# Patient Record
Sex: MALE | Race: OTHER | HISPANIC OR LATINO | URBAN - METROPOLITAN AREA
[De-identification: names, ages, dates, MRNs, and addresses within clinical notes are randomized per-mention and may not be internally consistent; named-entity substitution may affect disease eponyms.]

---

## 2023-04-07 ENCOUNTER — EMERGENCY (EMERGENCY)
Facility: HOSPITAL | Age: 53
LOS: 1 days | Discharge: HOME | End: 2023-04-07
Attending: EMERGENCY MEDICINE
Payer: SELF-PAY

## 2023-04-07 VITALS
HEIGHT: 67 IN | WEIGHT: 285.06 LBS | RESPIRATION RATE: 16 BRPM | OXYGEN SATURATION: 96 % | DIASTOLIC BLOOD PRESSURE: 91 MMHG | TEMPERATURE: 98 F | HEART RATE: 71 BPM | SYSTOLIC BLOOD PRESSURE: 164 MMHG

## 2023-04-07 VITALS
HEART RATE: 69 BPM | TEMPERATURE: 98 F | SYSTOLIC BLOOD PRESSURE: 147 MMHG | DIASTOLIC BLOOD PRESSURE: 79 MMHG | OXYGEN SATURATION: 97 % | RESPIRATION RATE: 15 BRPM

## 2023-04-07 LAB
ALBUMIN SERPL ELPH-MCNC: 4.3 G/DL — SIGNIFICANT CHANGE UP (ref 3.3–5.2)
ALP SERPL-CCNC: 79 U/L — SIGNIFICANT CHANGE UP (ref 40–120)
ALT FLD-CCNC: 28 U/L — SIGNIFICANT CHANGE UP
ANION GAP SERPL CALC-SCNC: 13 MMOL/L — SIGNIFICANT CHANGE UP (ref 5–17)
APTT BLD: 24.8 SEC — LOW (ref 27.5–35.5)
AST SERPL-CCNC: 27 U/L — SIGNIFICANT CHANGE UP
BILIRUB SERPL-MCNC: 0.5 MG/DL — SIGNIFICANT CHANGE UP (ref 0.4–2)
BUN SERPL-MCNC: 13.8 MG/DL — SIGNIFICANT CHANGE UP (ref 8–20)
CALCIUM SERPL-MCNC: 9.1 MG/DL — SIGNIFICANT CHANGE UP (ref 8.4–10.5)
CHLORIDE SERPL-SCNC: 103 MMOL/L — SIGNIFICANT CHANGE UP (ref 96–108)
CO2 SERPL-SCNC: 22 MMOL/L — SIGNIFICANT CHANGE UP (ref 22–29)
CREAT SERPL-MCNC: 0.78 MG/DL — SIGNIFICANT CHANGE UP (ref 0.5–1.3)
EGFR: 105 ML/MIN/1.73M2 — SIGNIFICANT CHANGE UP
GLUCOSE SERPL-MCNC: 106 MG/DL — HIGH (ref 70–99)
HCT VFR BLD CALC: 51.1 % — HIGH (ref 39–50)
HGB BLD-MCNC: 16.6 G/DL — SIGNIFICANT CHANGE UP (ref 13–17)
INR BLD: 0.94 RATIO — SIGNIFICANT CHANGE UP (ref 0.88–1.16)
MCHC RBC-ENTMCNC: 28.8 PG — SIGNIFICANT CHANGE UP (ref 27–34)
MCHC RBC-ENTMCNC: 32.5 GM/DL — SIGNIFICANT CHANGE UP (ref 32–36)
MCV RBC AUTO: 88.7 FL — SIGNIFICANT CHANGE UP (ref 80–100)
PLATELET # BLD AUTO: 305 K/UL — SIGNIFICANT CHANGE UP (ref 150–400)
POTASSIUM SERPL-MCNC: 4.8 MMOL/L — SIGNIFICANT CHANGE UP (ref 3.5–5.3)
POTASSIUM SERPL-SCNC: 4.8 MMOL/L — SIGNIFICANT CHANGE UP (ref 3.5–5.3)
PROT SERPL-MCNC: 7.2 G/DL — SIGNIFICANT CHANGE UP (ref 6.6–8.7)
PROTHROM AB SERPL-ACNC: 10.9 SEC — SIGNIFICANT CHANGE UP (ref 10.5–13.4)
RBC # BLD: 5.76 M/UL — SIGNIFICANT CHANGE UP (ref 4.2–5.8)
RBC # FLD: 12.8 % — SIGNIFICANT CHANGE UP (ref 10.3–14.5)
SODIUM SERPL-SCNC: 138 MMOL/L — SIGNIFICANT CHANGE UP (ref 135–145)
WBC # BLD: 9.6 K/UL — SIGNIFICANT CHANGE UP (ref 3.8–10.5)
WBC # FLD AUTO: 9.6 K/UL — SIGNIFICANT CHANGE UP (ref 3.8–10.5)

## 2023-04-07 PROCEDURE — 99285 EMERGENCY DEPT VISIT HI MDM: CPT

## 2023-04-07 PROCEDURE — 85027 COMPLETE CBC AUTOMATED: CPT

## 2023-04-07 PROCEDURE — 74177 CT ABD & PELVIS W/CONTRAST: CPT | Mod: 26,MA

## 2023-04-07 PROCEDURE — 99284 EMERGENCY DEPT VISIT MOD MDM: CPT | Mod: 25

## 2023-04-07 PROCEDURE — 85610 PROTHROMBIN TIME: CPT

## 2023-04-07 PROCEDURE — 96374 THER/PROPH/DIAG INJ IV PUSH: CPT | Mod: XU

## 2023-04-07 PROCEDURE — T1013: CPT

## 2023-04-07 PROCEDURE — 74177 CT ABD & PELVIS W/CONTRAST: CPT | Mod: MA

## 2023-04-07 PROCEDURE — 85730 THROMBOPLASTIN TIME PARTIAL: CPT

## 2023-04-07 PROCEDURE — 80053 COMPREHEN METABOLIC PANEL: CPT

## 2023-04-07 PROCEDURE — 36415 COLL VENOUS BLD VENIPUNCTURE: CPT

## 2023-04-07 RX ORDER — MORPHINE SULFATE 50 MG/1
8 CAPSULE, EXTENDED RELEASE ORAL ONCE
Refills: 0 | Status: DISCONTINUED | OUTPATIENT
Start: 2023-04-07 | End: 2023-04-07

## 2023-04-07 RX ADMIN — MORPHINE SULFATE 8 MILLIGRAM(S): 50 CAPSULE, EXTENDED RELEASE ORAL at 10:38

## 2023-04-07 RX ADMIN — MORPHINE SULFATE 8 MILLIGRAM(S): 50 CAPSULE, EXTENDED RELEASE ORAL at 14:39

## 2023-04-07 NOTE — ED PROVIDER NOTE - PROGRESS NOTE DETAILS
stable on reassessment. improved pain. Conversation had with patient regarding results of lab work and imaging. Patient agrees with plan for discharge at this time. Patient agrees to comply with follow up to primary care doctor/ surgery.  Return to ED precautions and discharge instructions discussed with patient with verbal understanding.

## 2023-04-07 NOTE — ED PROVIDER NOTE - ATTENDING CONTRIBUTION TO CARE
I, Jose Giles, performed a face to face bedside interview with this patient regarding history of present illness, review of symptoms and relevant past medical, social and family history.  I completed an independent physical examination. I have communicated the patient’s plan of care and disposition with the resident.  53 year old male presents with abd pain. Pt reports that eh has had an umbilical hernia for several years, but over the past 3 days it got slightly larger and is painful. associated nausea, no vomiting, and th ept is having normal BMs  Gen: NAD, well appearing  CV: RRR  Pul: CTA b/l  Abd: Soft, non-distended, umbilical hernia, soft, minimally tender  Neuro: no focal deficits  Pt improved, abd soft, non-incarcerated hernia, tolerating PO, stable for dc and outpt po mgt

## 2023-04-07 NOTE — ED PROVIDER NOTE - NSFOLLOWUPINSTRUCTIONS_ED_ALL_ED_FT
please follow up with surgery.    Please follow-up with your primary care doctor.  Please call for an appointment in the next 48 hours but if you cannot follow-up with your primary care doctor please return to the Emergency Department for any urgent issues.    You were given a copy of the tests performed today.  Please bring the results with you and review them with your primary care doctor.    If you have any worsening of symptoms or any other concerns please return to the Emergency Department immediately.    Please continue taking your home medications as directed.      Hernia, Adult      A hernia is the bulging of an organ or tissue through a weak spot in the muscles of the abdomen. Hernias develop most often near the belly button (navel) or the area where the leg meets the lower abdomen (groin).    Common types of hernias include:  Incisional hernia. This type bulges through a scar from an abdominal surgery.  Umbilical hernia. This type develops near the navel.  Inguinal hernia. This type develops in the groin or scrotum.  Femoral hernia. This type develops below the groin, in the upper thigh area.  Hiatal hernia. This type occurs when part of the stomach slides above the muscle that separates the abdomen from the chest (diaphragm).  What are the causes?  This condition may be caused by:  Heavy lifting.  Coughing over a long period of time.  Straining to have a bowel movement. Constipation can lead to straining.  An incision made during abdominal surgery.  A physical problem that is present at birth (congenital defect).  Being overweight or obese.  Smoking.  Excess fluid in the abdomen.  Undescended testicles in males.  What are the signs or symptoms?  The main symptom is a skin-colored, rounded bulge in the area of the hernia. However, a bulge may not always be present. It may grow bigger or be more visible when you cough or strain (such as when lifting something heavy).    A hernia that can be pushed back into the abdomen (is reducible) rarely causes pain. A hernia that cannot be pushed back into the abdomen (is incarcerated) may lose its blood supply (become strangulated). A hernia that is incarcerated may cause:  Pain.  Fever.  Nausea and vomiting.  Swelling.  Constipation.  How is this diagnosed?  A hernia may be diagnosed based on:  Your symptoms and medical history.  A physical exam. Your health care provider may ask you to cough or move in certain ways to see if the hernia becomes visible.  Imaging tests, such as:  X-rays.  Ultrasound.  CT scan.  How is this treated?  A hernia that is small and painless may not need to be treated. A hernia that is large or painful may be treated with surgery. Inguinal hernias may be treated with surgery to prevent incarceration or strangulation. Strangulated hernias are always treated with surgery because the strangulation causes a lack of blood supply to the trapped organ or tissue.    Surgery to treat a hernia involves pushing the bulge back into place and repairing the weak area of the muscle or abdominal wall.    Follow these instructions at home:  Activity    Avoid straining.  Do not lift anything that is heavier than 10 lb (4.5 kg), or the limit that you are told, until your health care provider says that it is safe.  When lifting heavy objects, lift with your leg muscles, not your back muscles.  Preventing constipation    Take actions to prevent constipation. Constipation leads to straining with bowel movements, which can make a hernia worse or cause a hernia repair to break down. Your health care provider may recommend that you take these actions to prevent or treat constipation:  Drink enough fluid to keep your urine pale yellow.  Take over-the-counter or prescription medicines.  Eat foods that are high in fiber, such as beans, whole grains, and fresh fruits and vegetables.  Limit foods that are high in fat and processed sugars, such as fried or sweet foods.  General instructions    When coughing, try to cough gently.  You may try to push the hernia back in place by very gently pressing on it while lying down. Do not try to force the bulge back in if it will not push in easily.  If you are overweight, work with your health care provider to lose weight safely.  Do not use any products that contain nicotine or tobacco. These products include cigarettes, chewing tobacco, and vaping devices, such as e-cigarettes. If you need help quitting, ask your health care provider.  If you are scheduled for hernia repair, watch your hernia for any changes in shape, size, or color. Tell your health care provider about any changes or new symptoms.  Take over-the-counter and prescription medicines only as told by your health care provider.  Keep all follow-up visits. This is important.  Contact a health care provider if:  You develop new pain, swelling, or redness around your hernia.  You have signs of constipation, such as:  Fewer bowel movements in a week than normal.  Difficulty having a bowel movement.  Stools that are dry, hard, or larger than normal.  Get help right away if:  You have a fever or chills.  You have abdominal pain that gets worse.  You feel nauseous or you vomit.  You cannot push the hernia back in place by very gently pressing on it while lying down. Do not try to force the bulge back in if it will not go in easily.  The hernia:  Changes in shape, size, or color.  Feels hard or tender.  These symptoms may represent a serious problem that is an emergency. Do not wait to see if the symptoms will go away. Get medical help right away. Call your local emergency services (911 in the [place] Do not drive yourself to the hospital.    Summary  A hernia is the bulging of an organ or tissue through a weak spot in the muscles of the abdomen.  The main symptom is a skin-colored bulge in the hernia area. However, a bulge may not always be present. It may grow bigger or more visible when you cough or strain (such as when having a bowel movement).  A hernia that is small and painless may not need to be treated. A hernia that is large or painful may be treated with surgery.  Surgery to treat a hernia involves pushing the bulge back into place and repairing the weak part of the abdomen.  This information is not intended to replace advice given to you by your health care provider. Make sure you discuss any questions you have with your health care provider.
details…

## 2023-04-07 NOTE — ED ADULT NURSE NOTE - OBJECTIVE STATEMENT
Pt is a 53 year old male coming to the ED for painful hernia. Pt is A&O x3. Pt stated hernia began more than six months ago. Pt stated that severe pain began yesterday. Pt stated that he experienced cold sweats last night. Pt has flatus. Patient denies fevers, n/v/d, changes to bowel movements, difficulty or changes in urination, palpitations, shortness of breath or difficulty breathing.

## 2023-04-07 NOTE — ED PROVIDER NOTE - PATIENT PORTAL LINK FT
You can access the FollowMyHealth Patient Portal offered by [facility] by registering at the following website: [email or web address]. By joining [facility]’s FollowMyHealth portal, you will also be able to view your health information using other applications (apps) compatible with our system.

## 2023-04-07 NOTE — ED PROVIDER NOTE - PHYSICAL EXAMINATION
General: Well appearing in no acute distress, alert and cooperative  Head: Normocephalic, atraumatic  Eyes: PERRLA, no conjunctival injection, no scleral icterus, EOMI  ENMT: Atraumatic external nose and ears  Neck: Soft and supple, full ROM without pain  Cardiac: Regular rate and regular rhythm, no murmurs, no LE edema.  Resp: Unlabored respiratory effort, lungs CTAB  Abd: Soft, non-distended, umbilical bulging, mass with tenderness.   MSK: Spine midline and non-tender  Skin: Warm and dry  Neuro: AO x 3, moves all extremities symmetrically, Motor strength and sensation grossly intact

## 2023-04-07 NOTE — ED PROVIDER NOTE - OBJECTIVE STATEMENT
53y Male with history of HTN presenting with abdominal pain and bulging mass. Patient reports umbilical hernia for many years but this morning experienced sudden pain with increase in size. Denies lifting heavy object or injury. Reports normal bowel movement. Denies fevers, chills, headache, chest pain, palpitations, shortness of breath, cough, nausea, vomiting, diarrhea, hematuria, dysuria, dark stools, focal neurologic symptoms.

## 2023-04-07 NOTE — ED PROVIDER NOTE - NSFOLLOWUPCLINICS_GEN_ALL_ED_FT
[facility]  Acute Care Surgery  [address] [place] [id number]  Phone: [phone]  Fax:   Follow Up Time: 4-6 Days

## 2023-04-07 NOTE — ED ADULT NURSE REASSESSMENT NOTE - NS ED NURSE REASSESS COMMENT FT1
Pt updated on the plan of care, pts wife and daughter at the bedside, pt has no pain at this time, pt states that his pain is better than it was on arrival and puts it at a 2/10 at the moment, pt VS recorded and placed in the EMR, pt is awaiting results of the CT scan, blood work returned was reviewed with patient at this time. Pt IV is in tact, flushes without difficulty, no distress on flushing, no redness at site, clean dry and intact.
Pt is A&O4, pt able to ambulate safely and steadily w/out assistance, denies dizziness/weakness upon standing, patients IV was removed and the catheter is in tact, bleeding controlled, pt discharged home and paperwork was signed, reviewed with patient. Follow up treatment and plan for discharge for ambulatory surgery was reviewed with the patient. reviewed with pt the contact information and referrals coordinator if obtaining an appointment is difficult to obtain, pt states understanding of education and education deemed successful after teach back shows proficiency. Vital Signs recorded in the EMR. Pt education deemed successful at time of discharge after teach back proves proficiency. Pt has no distress at time of discharge, pt provided discharge instruction paperwork.

## 2023-04-07 NOTE — ED PROVIDER NOTE - CLINICAL SUMMARY MEDICAL DECISION MAKING FREE TEXT BOX
53y Male with umbilical bulging mass that is increased in size from prior. No chest pain, shortness of breath, vomiting, constipation. Hemodynamically stable, abdomen soft with umbilical bulging mass with tenderness. Differential diagnosis includes but not limited to hernia. 53y Male with umbilical bulging mass that is increased in size from prior. No chest pain, shortness of breath, vomiting, constipation. Hemodynamically stable, abdomen soft with umbilical bulging mass with tenderness. Differential diagnosis includes but not limited to hernia. Labs and imaging independently reviewed and interpreted with Fat-containing periumbilical and left inguinal hernias. No bowel obstruction. Will dc with surgery follow up.